# Patient Record
Sex: FEMALE | Race: WHITE
[De-identification: names, ages, dates, MRNs, and addresses within clinical notes are randomized per-mention and may not be internally consistent; named-entity substitution may affect disease eponyms.]

---

## 2019-10-05 ENCOUNTER — HOSPITAL ENCOUNTER (EMERGENCY)
Dept: HOSPITAL 94 - ER | Age: 54
Discharge: HOME | End: 2019-10-05
Payer: COMMERCIAL

## 2019-10-05 VITALS — WEIGHT: 201.94 LBS | BODY MASS INDEX: 28.91 KG/M2 | HEIGHT: 70 IN

## 2019-10-05 VITALS — SYSTOLIC BLOOD PRESSURE: 193 MMHG | DIASTOLIC BLOOD PRESSURE: 102 MMHG

## 2019-10-05 DIAGNOSIS — G89.29: ICD-10-CM

## 2019-10-05 DIAGNOSIS — F17.200: ICD-10-CM

## 2019-10-05 DIAGNOSIS — J44.9: ICD-10-CM

## 2019-10-05 DIAGNOSIS — E11.42: ICD-10-CM

## 2019-10-05 DIAGNOSIS — M54.2: Primary | ICD-10-CM

## 2019-10-05 DIAGNOSIS — I10: ICD-10-CM

## 2019-10-05 DIAGNOSIS — Z88.1: ICD-10-CM

## 2019-10-05 DIAGNOSIS — Z86.73: ICD-10-CM

## 2019-10-05 DIAGNOSIS — E78.00: ICD-10-CM

## 2019-10-05 DIAGNOSIS — Z90.710: ICD-10-CM

## 2019-10-05 DIAGNOSIS — Z98.890: ICD-10-CM

## 2019-10-05 DIAGNOSIS — E11.9: ICD-10-CM

## 2019-10-05 PROCEDURE — 72040 X-RAY EXAM NECK SPINE 2-3 VW: CPT

## 2019-10-05 PROCEDURE — 99284 EMERGENCY DEPT VISIT MOD MDM: CPT

## 2021-03-29 LAB
ALBUMIN SERPL BCP-MCNC: 3.9 G/DL (ref 3.4–5)
ALBUMIN/GLOB SERPL: 1.2 {RATIO} (ref 1.1–1.5)
ALP SERPL-CCNC: 102 IU/L (ref 46–116)
ALT SERPL W P-5'-P-CCNC: 27 U/L (ref 30–65)
ANION GAP SERPL CALCULATED.3IONS-SCNC: 11 MMOL/L (ref 8–16)
APTT PPP: 27 SECONDS (ref 22–32)
AST SERPL W P-5'-P-CCNC: 20 U/L (ref 10–37)
BASOPHILS # BLD AUTO: 0.1 X10'3 (ref 0–0.2)
BASOPHILS NFR BLD AUTO: 1.1 % (ref 0–1)
BILIRUB SERPL-MCNC: 0.5 MG/DL (ref 0–1)
BUN SERPL-MCNC: 31 MG/DL (ref 7–18)
BUN/CREAT SERPL: 20.9 (ref 6.6–38)
CALCIUM SERPL-MCNC: 9.4 MG/DL (ref 8.5–10.1)
CHLORIDE SERPL-SCNC: 104 MMOL/L (ref 99–107)
CO2 SERPL-SCNC: 28 MMOL/L (ref 24–32)
CREAT SERPL-MCNC: 1.48 MG/DL (ref 0.4–0.9)
EOSINOPHIL # BLD AUTO: 0.3 X10'3 (ref 0–0.9)
EOSINOPHIL NFR BLD AUTO: 3.3 % (ref 0–6)
ERYTHROCYTE [DISTWIDTH] IN BLOOD BY AUTOMATED COUNT: 12.8 % (ref 11.5–14.5)
GFR SERPL CREATININE-BSD FRML MDRD: 36 ML/MIN
GLUCOSE SERPL-MCNC: 123 MG/DL (ref 70–104)
HCT VFR BLD AUTO: 37.8 % (ref 35–45)
HGB BLD-MCNC: 13 G/DL (ref 12–16)
INR PPP: 1 INR
LYMPHOCYTES # BLD AUTO: 2.5 X10'3 (ref 1.1–4.8)
LYMPHOCYTES NFR BLD AUTO: 25.5 % (ref 21–51)
MCH RBC QN AUTO: 30.5 PG (ref 27–31)
MCHC RBC AUTO-ENTMCNC: 34.3 G/DL (ref 33–36.5)
MCV RBC AUTO: 89 FL (ref 78–98)
MONOCYTES # BLD AUTO: 0.6 X10'3 (ref 0–0.9)
MONOCYTES NFR BLD AUTO: 6 % (ref 2–12)
NEUTROPHILS # BLD AUTO: 6.2 X10'3 (ref 1.8–7.7)
NEUTROPHILS NFR BLD AUTO: 64.1 % (ref 42–75)
PLATELET # BLD AUTO: 293 X10'3 (ref 140–440)
PMV BLD AUTO: 7.5 FL (ref 7.4–10.4)
POTASSIUM SERPL-SCNC: 4.5 MMOL/L (ref 3.4–5.1)
PROT SERPL-MCNC: 7.1 G/DL (ref 6.4–8.2)
PROTHROMBIN TIME: 10.8 SECONDS (ref 9–12)
RBC # BLD AUTO: 4.25 X10'6 (ref 4.2–5.6)
SODIUM SERPL-SCNC: 143 MMOL/L (ref 135–145)

## 2021-04-02 ENCOUNTER — HOSPITAL ENCOUNTER (OUTPATIENT)
Dept: HOSPITAL 94 - PAS | Age: 56
Discharge: HOME | End: 2021-04-02
Attending: PODIATRIST
Payer: COMMERCIAL

## 2021-04-02 VITALS — DIASTOLIC BLOOD PRESSURE: 81 MMHG | SYSTOLIC BLOOD PRESSURE: 141 MMHG

## 2021-04-02 VITALS — DIASTOLIC BLOOD PRESSURE: 76 MMHG | SYSTOLIC BLOOD PRESSURE: 137 MMHG

## 2021-04-02 VITALS — SYSTOLIC BLOOD PRESSURE: 108 MMHG | DIASTOLIC BLOOD PRESSURE: 47 MMHG

## 2021-04-02 VITALS — SYSTOLIC BLOOD PRESSURE: 146 MMHG | DIASTOLIC BLOOD PRESSURE: 83 MMHG

## 2021-04-02 VITALS — DIASTOLIC BLOOD PRESSURE: 93 MMHG | SYSTOLIC BLOOD PRESSURE: 144 MMHG

## 2021-04-02 VITALS — DIASTOLIC BLOOD PRESSURE: 47 MMHG | SYSTOLIC BLOOD PRESSURE: 108 MMHG

## 2021-04-02 VITALS — BODY MASS INDEX: 24.48 KG/M2 | HEIGHT: 70 IN | WEIGHT: 171 LBS

## 2021-04-02 VITALS — DIASTOLIC BLOOD PRESSURE: 83 MMHG | SYSTOLIC BLOOD PRESSURE: 143 MMHG

## 2021-04-02 VITALS — DIASTOLIC BLOOD PRESSURE: 72 MMHG | SYSTOLIC BLOOD PRESSURE: 132 MMHG

## 2021-04-02 VITALS — SYSTOLIC BLOOD PRESSURE: 158 MMHG | DIASTOLIC BLOOD PRESSURE: 74 MMHG

## 2021-04-02 VITALS — DIASTOLIC BLOOD PRESSURE: 78 MMHG | SYSTOLIC BLOOD PRESSURE: 135 MMHG

## 2021-04-02 VITALS — SYSTOLIC BLOOD PRESSURE: 153 MMHG | DIASTOLIC BLOOD PRESSURE: 82 MMHG

## 2021-04-02 VITALS — SYSTOLIC BLOOD PRESSURE: 138 MMHG | DIASTOLIC BLOOD PRESSURE: 83 MMHG

## 2021-04-02 VITALS — DIASTOLIC BLOOD PRESSURE: 86 MMHG | SYSTOLIC BLOOD PRESSURE: 151 MMHG

## 2021-04-02 DIAGNOSIS — Z86.73: ICD-10-CM

## 2021-04-02 DIAGNOSIS — G47.30: ICD-10-CM
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
## 2021-04-02 DIAGNOSIS — Z72.89: ICD-10-CM

## 2021-04-02 DIAGNOSIS — Z91.018: ICD-10-CM

## 2021-04-02 DIAGNOSIS — Z98.890: ICD-10-CM

## 2021-04-02 DIAGNOSIS — D64.9: ICD-10-CM

## 2021-04-02 DIAGNOSIS — Z98.51: ICD-10-CM

## 2021-04-02 DIAGNOSIS — D81.30: ICD-10-CM

## 2021-04-02 DIAGNOSIS — Z79.899: ICD-10-CM

## 2021-04-02 DIAGNOSIS — Z79.4: ICD-10-CM

## 2021-04-02 DIAGNOSIS — Z88.1: ICD-10-CM

## 2021-04-02 DIAGNOSIS — J44.9: ICD-10-CM

## 2021-04-02 DIAGNOSIS — Z90.710: ICD-10-CM

## 2021-04-02 DIAGNOSIS — S92.352K: Primary | ICD-10-CM

## 2021-04-02 DIAGNOSIS — Z20.822: ICD-10-CM

## 2021-04-02 DIAGNOSIS — F43.10: ICD-10-CM

## 2021-04-02 DIAGNOSIS — Z88.8: ICD-10-CM

## 2021-04-02 DIAGNOSIS — F17.210: ICD-10-CM

## 2021-04-02 DIAGNOSIS — Z79.01: ICD-10-CM

## 2021-04-02 DIAGNOSIS — Z98.1: ICD-10-CM

## 2021-04-02 DIAGNOSIS — F60.9: ICD-10-CM

## 2021-04-02 DIAGNOSIS — F31.9: ICD-10-CM

## 2021-04-02 DIAGNOSIS — E11.9: ICD-10-CM

## 2021-04-02 DIAGNOSIS — I10: ICD-10-CM

## 2021-04-02 DIAGNOSIS — M19.90: ICD-10-CM

## 2021-04-02 PROCEDURE — 94640 AIRWAY INHALATION TREATMENT: CPT

## 2021-04-02 PROCEDURE — 73620 X-RAY EXAM OF FOOT: CPT

## 2021-04-02 PROCEDURE — 93005 ELECTROCARDIOGRAM TRACING: CPT

## 2021-04-02 PROCEDURE — 28322 REPAIR OF METATARSALS: CPT

## 2021-04-02 PROCEDURE — 85025 COMPLETE CBC W/AUTO DIFF WBC: CPT

## 2021-04-02 PROCEDURE — 76000 FLUOROSCOPY <1 HR PHYS/QHP: CPT

## 2021-04-02 PROCEDURE — 94760 N-INVAS EAR/PLS OXIMETRY 1: CPT

## 2021-04-02 PROCEDURE — 36415 COLL VENOUS BLD VENIPUNCTURE: CPT

## 2021-04-02 PROCEDURE — 82948 REAGENT STRIP/BLOOD GLUCOSE: CPT

## 2021-04-02 PROCEDURE — 85610 PROTHROMBIN TIME: CPT

## 2021-04-02 PROCEDURE — 85730 THROMBOPLASTIN TIME PARTIAL: CPT

## 2021-04-02 PROCEDURE — 80053 COMPREHEN METABOLIC PANEL: CPT

## 2021-04-02 NOTE — NUR
RECEIVED PT VIA [facility] FROM SURGERY WITH ANESTHESIOLOGIST PRESENT. REPORT GIVEN BY DR VARNER, PT SLEEPY, VSS, DENIES PAIN, PIV LUE 20G-LR RUNNING 100/HR, NEUROVASCULAR 
CHECKS WNL-LEFT FOOT WRAPPED, TOES PINK/WARM/ + CAP REFILL, DSG TO LEFT FOOT-CDI ELEVATED 
WITH ICE BEHIND KNEE.

## 2021-04-02 NOTE — NUR
PT VSS, WAS ABLE TO GET DRESSED AND USE THE BATHROOM, DID HAVE SOME FOOT PAIN WHILE UP 
-DEMEROL GIVEN AND PAIN TOLERABLE AT PRESENT, PT WILL USE RX PAIN MEDS ONCE HOME, PIV D/C 
-NO SN/SX OF INFECTION, NEUROVASCULAR CHECKS WNL-LEFT FOOT TOES PINK WARM, +CAP REFILL, SCD 
REMOVED, LEFT FOOT DRSG-CDI, PT AND SISTER GIVEN DISCHARGE INSTRUCTIONS-ALL QUESTIONS 
ANSWERED, PT/FAMILY VERBALIZED UNDERSTANDING, PT TAKEN WITH CRUTCHES, BOOT, CANE, AND 1 BAG 
OF BELONGINGS TO FAMILY WHO GAVE TRANSPORT HOME

## 2021-08-12 ENCOUNTER — HOSPITAL ENCOUNTER (EMERGENCY)
Dept: HOSPITAL 94 - ER | Age: 56
Discharge: HOME | End: 2021-08-12
Payer: COMMERCIAL

## 2021-08-12 VITALS — HEIGHT: 67 IN | BODY MASS INDEX: 27.37 KG/M2 | WEIGHT: 174.36 LBS

## 2021-08-12 VITALS — DIASTOLIC BLOOD PRESSURE: 67 MMHG | SYSTOLIC BLOOD PRESSURE: 128 MMHG

## 2021-08-12 DIAGNOSIS — E78.00: ICD-10-CM

## 2021-08-12 DIAGNOSIS — H53.9: ICD-10-CM
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
## 2021-08-12 DIAGNOSIS — R42: ICD-10-CM
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
## 2021-08-12 DIAGNOSIS — M54.9: Primary | ICD-10-CM

## 2021-08-12 DIAGNOSIS — J44.9: ICD-10-CM
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
## 2021-08-12 DIAGNOSIS — Z88.1: ICD-10-CM

## 2021-08-12 DIAGNOSIS — I51.9: ICD-10-CM

## 2021-08-12 DIAGNOSIS — Z88.8: ICD-10-CM

## 2021-08-12 DIAGNOSIS — Z91.010: ICD-10-CM

## 2021-08-12 DIAGNOSIS — E87.6: ICD-10-CM
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
## 2021-08-12 DIAGNOSIS — M25.511: ICD-10-CM

## 2021-08-12 LAB
ALBUMIN SERPL BCP-MCNC: 3.6 G/DL (ref 3.4–5)
ALBUMIN/GLOB SERPL: 1.1 {RATIO} (ref 1.1–1.5)
ALP SERPL-CCNC: 96 IU/L (ref 46–116)
ALT SERPL W P-5'-P-CCNC: 33 U/L (ref 12–78)
ANION GAP SERPL CALCULATED.3IONS-SCNC: 8 MMOL/L (ref 8–16)
AST SERPL W P-5'-P-CCNC: 20 U/L (ref 10–37)
BASOPHILS # BLD AUTO: 0.1 X10'3 (ref 0–0.2)
BASOPHILS NFR BLD AUTO: 1 % (ref 0–1)
BILIRUB SERPL-MCNC: 0.2 MG/DL (ref 0.1–1)
BUN SERPL-MCNC: 15 MG/DL (ref 7–18)
BUN/CREAT SERPL: 14.6 (ref 6.6–38)
CALCIUM SERPL-MCNC: 8.6 MG/DL (ref 8.5–10.1)
CHLORIDE SERPL-SCNC: 111 MMOL/L (ref 99–107)
CO2 SERPL-SCNC: 27.8 MMOL/L (ref 24–32)
CREAT SERPL-MCNC: 1.03 MG/DL (ref 0.4–0.9)
EOSINOPHIL # BLD AUTO: 0.5 X10'3 (ref 0–0.9)
EOSINOPHIL NFR BLD AUTO: 5.7 % (ref 0–6)
ERYTHROCYTE [DISTWIDTH] IN BLOOD BY AUTOMATED COUNT: 12.7 % (ref 11.5–14.5)
GFR SERPL CREATININE-BSD FRML MDRD: 55 ML/MIN
GLUCOSE SERPL-MCNC: 45 MG/DL (ref 70–104)
HCT VFR BLD AUTO: 33.7 % (ref 35–45)
HGB BLD-MCNC: 11.7 G/DL (ref 12–16)
LYMPHOCYTES # BLD AUTO: 3 X10'3 (ref 1.1–4.8)
LYMPHOCYTES NFR BLD AUTO: 35.4 % (ref 21–51)
MCH RBC QN AUTO: 30.7 PG (ref 27–31)
MCHC RBC AUTO-ENTMCNC: 34.8 G/DL (ref 33–36.5)
MCV RBC AUTO: 88.3 FL (ref 78–98)
MONOCYTES # BLD AUTO: 0.8 X10'3 (ref 0–0.9)
MONOCYTES NFR BLD AUTO: 9.3 % (ref 2–12)
NEUTROPHILS # BLD AUTO: 4.1 X10'3 (ref 1.8–7.7)
NEUTROPHILS NFR BLD AUTO: 48.6 % (ref 42–75)
PLATELET # BLD AUTO: 287 X10'3 (ref 140–440)
PMV BLD AUTO: 7.6 FL (ref 7.4–10.4)
POTASSIUM SERPL-SCNC: 3.9 MMOL/L (ref 3.5–5.1)
PROT SERPL-MCNC: 6.9 G/DL (ref 6.4–8.2)
RBC # BLD AUTO: 3.82 X10'6 (ref 4.2–5.6)
SODIUM SERPL-SCNC: 147 MMOL/L (ref 135–145)
WBC # BLD AUTO: 8.4 X10'3 (ref 4.5–11)

## 2021-08-12 PROCEDURE — 96374 THER/PROPH/DIAG INJ IV PUSH: CPT

## 2021-08-12 PROCEDURE — 73090 X-RAY EXAM OF FOREARM: CPT

## 2021-08-12 PROCEDURE — 36415 COLL VENOUS BLD VENIPUNCTURE: CPT

## 2021-08-12 PROCEDURE — 70450 CT HEAD/BRAIN W/O DYE: CPT

## 2021-08-12 PROCEDURE — 80053 COMPREHEN METABOLIC PANEL: CPT

## 2021-08-12 PROCEDURE — 72131 CT LUMBAR SPINE W/O DYE: CPT

## 2021-08-12 PROCEDURE — 72125 CT NECK SPINE W/O DYE: CPT

## 2021-08-12 PROCEDURE — 82948 REAGENT STRIP/BLOOD GLUCOSE: CPT

## 2021-08-12 PROCEDURE — 73030 X-RAY EXAM OF SHOULDER: CPT

## 2021-08-12 PROCEDURE — 85025 COMPLETE CBC W/AUTO DIFF WBC: CPT

## 2021-08-12 PROCEDURE — 99285 EMERGENCY DEPT VISIT HI MDM: CPT

## 2021-08-12 NOTE — NUR
PA AT BEDSIDE, PT GIVEN TWO ORANGE JUICES PRIOR TO IV ACCESS.  STATES SHE GAVE 
HERSELF 12 UNITS OF HUMALOG TODAY AT 0900 AND HAS NOT EATEN.